# Patient Record
Sex: FEMALE | Race: WHITE | NOT HISPANIC OR LATINO | Employment: OTHER | URBAN - METROPOLITAN AREA
[De-identification: names, ages, dates, MRNs, and addresses within clinical notes are randomized per-mention and may not be internally consistent; named-entity substitution may affect disease eponyms.]

---

## 2020-12-02 ENCOUNTER — TELEPHONE ENCOUNTER (OUTPATIENT)
Dept: URBAN - METROPOLITAN AREA CLINIC 35 | Facility: CLINIC | Age: 65
End: 2020-12-02

## 2020-12-02 ENCOUNTER — OFFICE VISIT (OUTPATIENT)
Dept: URBAN - METROPOLITAN AREA CLINIC 35 | Facility: CLINIC | Age: 65
End: 2020-12-02

## 2020-12-02 ENCOUNTER — LAB OUTSIDE AN ENCOUNTER (OUTPATIENT)
Dept: URBAN - METROPOLITAN AREA SURGERY CENTER 8 | Facility: SURGERY CENTER | Age: 65
End: 2020-12-02

## 2020-12-02 VITALS
DIASTOLIC BLOOD PRESSURE: 74 MMHG | WEIGHT: 163 LBS | OXYGEN SATURATION: 95 % | SYSTOLIC BLOOD PRESSURE: 122 MMHG | HEART RATE: 72 BPM | BODY MASS INDEX: 24.71 KG/M2 | HEIGHT: 68 IN

## 2020-12-02 PROBLEM — 266433003 GASTRO-ESOPHAGEAL REFLUX DISEASE WITH ESOPHAGITIS: Status: ACTIVE | Noted: 2017-10-11

## 2020-12-02 RX ORDER — OMEPRAZOLE 40 MG/1
1 CAPSULE CAPSULE, DELAYED RELEASE ORAL ONCE A DAY
Qty: 30 CAPSULE | Refills: 5 | Status: ACTIVE | COMMUNITY
Start: 2017-08-23

## 2020-12-02 RX ORDER — ROSUVASTATIN CALCIUM 10 MG/1
1 TABLET TABLET, FILM COATED ORAL ONCE A DAY
Status: DISCONTINUED | COMMUNITY

## 2020-12-02 RX ORDER — RASAGILINE 1 MG/1
1 TABLET TABLET ORAL ONCE A DAY
Status: DISCONTINUED | COMMUNITY

## 2020-12-02 RX ORDER — CLONAZEPAM 0.5 MG/1
1 TABLET TABLET ORAL DAILY
Status: ACTIVE | COMMUNITY

## 2020-12-02 RX ORDER — CITALOPRAM HYDROBROMIDE 20 MG/1
1 TABLET TABLET ORAL ONCE A DAY
Status: ACTIVE | COMMUNITY

## 2020-12-02 RX ORDER — BUPROPION HYDROCHLORIDE 300 MG/1
1 TABLET IN THE MORNING TABLET, EXTENDED RELEASE ORAL ONCE A DAY
Status: ACTIVE | COMMUNITY

## 2020-12-02 RX ORDER — SODIUM SULFATE, POTASSIUM SULFATE, MAGNESIUM SULFATE 17.5; 3.13; 1.6 G/ML; G/ML; G/ML
177 ML SOLUTION, CONCENTRATE ORAL
Qty: 1 KIT | Refills: 0 | OUTPATIENT
Start: 2020-12-02

## 2020-12-02 RX ORDER — IBUPROFEN SODIUM 256 MG/1
1 TABLET WITH FOOD OR MILK AS NEEDED TABLET, COATED ORAL
Status: ACTIVE | COMMUNITY

## 2020-12-02 NOTE — HPI-MIGRATED HPI
;   ;     Heartburn : Patient presents today for a follow-up office visit from 10/30/2019. Patient is currently taking Omeprazole 40 mg, daily. Patient states heartburn is not controlled. Four days ago, she began taking yani root to help with heartburn and states that this helped relieve heartburn. Patient requests refills of Omeprazole 40 mg, kendall, in a 90 day supply, sent to confirmed pharmacy in EMR. ;   Abdominal Pain : Patient states that she began having abdominal pain in the epigastric region, since about June of 2020. She described this pain as a stabbing. Patient states that she experiences this pain, about every 2-3 days, with episodes lasting "a couple of hours". Patient states that dairy foods make the pain worse. She has changed to almond milk, but she still eats ice cream and cheese. Patient tried taking Advil for the abdominal pain and it did not help. She states that only time helps. Patient has not had any blood work, tests, or imaging done related to the abdominal pain. Patient states that she used to have a bowel movement every other day, however, "this fall", patient states that she is now going 4-5 days without having a bowel movement. Patient has not tried taking anything to help with improving her bowel habits. Patient admits to straining, "a lot". She admits that at times she has to manually remove the stool. Patient admits that her stool is very hard. She denies any blood or mucous in her stool. Patient denies melena. The only change in medication that she has had was to discontinue Rasagiline Mesylate. She was misdiagnosed with Parkinson's disease and stopped this medication about 60 days ago. As it turns out, the proper diagnosis is tardive dyskinesia. She is taking Clonazepam 0.5 mg, to help with symptoms of tardive dyskinesia. Patient had has unintentional weight loss of (by our records), 31 pounds since 10/30/2019. She believes this may be more, as she may have gained some (and sub sequentially lost), weight during this same time period.  She is having alot of constipation.;

## 2020-12-02 NOTE — EXAM-MIGRATED EXAMINATIONS
GENERAL APPEARANCE: - in no acute distress, well developed, well nourished;   HEAD: - normocephalic, atraumatic;   EYES: - pupils equal, round, reactive to light and accommodation, sclera anicteric;   EARS: - normal;   ORAL CAVITY: - mucosa moist, no lesions;   THROAT: - clear;   NECK/THYROID: - neck supple, full range of motion, no cervical lymphadenopathy, trachea midline, thyroid normal, , no supraclavicular lymphadenopathy;   LYMPH NODES: - no cervical adenopathy, ..., no supraclavicular adenopathy, cervical nodes firm;   SKIN: - no suspicious lesions, warm and dry;   HEART: - no murmurs, regular rate and rhythm, S1, S2 normal, no S3, S4, no rubs;   LUNGS: - clear to auscultation bilaterally;   CHEST: - normal;   ABDOMEN: - normal,  bowel sounds present,  soft, mild LLQ and midepigastric tenderness noted, nondistended;   RECTAL: - not examined;   EXTREMITIES: - no clubbing, cyanosis, or edema;   NEUROLOGIC: - nonfocal,  motor strength normal upper and lower extremities,  sensory exam intact.  Mild residual tardive dyskinesia noted.;   PSYCH: - alert, oriented, ..., Mood and affect are approprioate for current condition;

## 2020-12-03 ENCOUNTER — TELEPHONE ENCOUNTER (OUTPATIENT)
Dept: URBAN - METROPOLITAN AREA CLINIC 35 | Facility: CLINIC | Age: 65
End: 2020-12-03

## 2020-12-07 ENCOUNTER — TELEPHONE ENCOUNTER (OUTPATIENT)
Dept: URBAN - METROPOLITAN AREA CLINIC 35 | Facility: CLINIC | Age: 65
End: 2020-12-07

## 2020-12-07 RX ORDER — OMEPRAZOLE 40 MG/1
1 CAPSULE CAPSULE, DELAYED RELEASE ORAL ONCE A DAY
Qty: 90 CAPSULE | Refills: 2 | OUTPATIENT
Start: 2017-08-23

## 2020-12-08 ENCOUNTER — TELEPHONE ENCOUNTER (OUTPATIENT)
Dept: URBAN - METROPOLITAN AREA CLINIC 35 | Facility: CLINIC | Age: 65
End: 2020-12-08

## 2020-12-09 ENCOUNTER — OFFICE VISIT (OUTPATIENT)
Dept: URBAN - METROPOLITAN AREA SURGERY CENTER 8 | Facility: SURGERY CENTER | Age: 65
End: 2020-12-09

## 2020-12-23 ENCOUNTER — OFFICE VISIT (OUTPATIENT)
Dept: URBAN - METROPOLITAN AREA CLINIC 35 | Facility: CLINIC | Age: 65
End: 2020-12-23

## 2020-12-23 ENCOUNTER — TELEPHONE ENCOUNTER (OUTPATIENT)
Dept: URBAN - METROPOLITAN AREA CLINIC 35 | Facility: CLINIC | Age: 65
End: 2020-12-23

## 2020-12-23 VITALS — HEIGHT: 68 IN | BODY MASS INDEX: 24.25 KG/M2 | WEIGHT: 160 LBS

## 2020-12-23 PROBLEM — 84568007 ATROPHIC GASTRITIS: Status: ACTIVE | Noted: 2017-10-11

## 2020-12-23 PROBLEM — 63305008 STRICTURE OF ESOPHAGUS: Status: ACTIVE | Noted: 2017-10-11

## 2020-12-23 RX ORDER — OMEPRAZOLE 40 MG/1
1 CAPSULE CAPSULE, DELAYED RELEASE ORAL ONCE A DAY
Qty: 90 CAPSULE | Refills: 2 | Status: ACTIVE | COMMUNITY
Start: 2017-08-23

## 2020-12-23 RX ORDER — SODIUM SULFATE, POTASSIUM SULFATE, MAGNESIUM SULFATE 17.5; 3.13; 1.6 G/ML; G/ML; G/ML
177 ML SOLUTION, CONCENTRATE ORAL
Qty: 1 KIT | Refills: 0 | Status: ACTIVE | COMMUNITY
Start: 2020-12-02

## 2020-12-23 RX ORDER — CHOLECALCIFEROL (VITAMIN D3) 50 MCG
1 TABLET TABLET ORAL DAILY
Status: ACTIVE | COMMUNITY

## 2020-12-23 RX ORDER — BUPROPION HYDROCHLORIDE 300 MG/1
1 TABLET IN THE MORNING TABLET, EXTENDED RELEASE ORAL ONCE A DAY
Status: ACTIVE | COMMUNITY

## 2020-12-23 RX ORDER — CLONAZEPAM 0.5 MG/1
1 TABLET TABLET ORAL DAILY
Status: ACTIVE | COMMUNITY

## 2020-12-23 RX ORDER — CITALOPRAM HYDROBROMIDE 20 MG/1
1 TABLET TABLET ORAL ONCE A DAY
Status: ACTIVE | COMMUNITY

## 2020-12-23 RX ORDER — IBUPROFEN SODIUM 256 MG/1
1 TABLET WITH FOOD OR MILK AS NEEDED TABLET, COATED ORAL
Status: DISCONTINUED | COMMUNITY

## 2020-12-23 RX ORDER — CYANOCOBALAMIN (VITAMIN B-12) 500 MCG
2 LOZENGE ORAL BID
Status: ACTIVE | COMMUNITY

## 2020-12-23 NOTE — HPI-MIGRATED HPI
;     Follow up- EGD : Patient presents today for a follow-up after the EGD that was done on 12/09/2020. Since the procedure, patient denies globus sensation, dysphagia, changes in appetite, and changes in bowel habits. Patient denies any complications following the procedure. Patient states that she has not gotten the labs done yet. She pays out of pocket and will be able to get the labs done shortly. Patient continues taking Omeprazole 40 mg, daily. Patient has added Vitamin E, 400 IU, 2 tablets per day. She has also added in 1 tablespoonful per day. She states that the abdominal pain has improved in both intensity and frequency. Patient admits to straining when she has a bowel movement. She is having 1 bowel movement every 2-3 days, with "stools that are getting softer. ;

## 2021-01-11 ENCOUNTER — LAB OUTSIDE AN ENCOUNTER (OUTPATIENT)
Dept: URBAN - METROPOLITAN AREA SURGERY CENTER 8 | Facility: SURGERY CENTER | Age: 66
End: 2021-01-11

## 2021-01-11 ENCOUNTER — OFFICE VISIT (OUTPATIENT)
Dept: URBAN - METROPOLITAN AREA CLINIC 35 | Facility: CLINIC | Age: 66
End: 2021-01-11

## 2021-01-11 VITALS
HEART RATE: 71 BPM | HEIGHT: 68 IN | WEIGHT: 164 LBS | BODY MASS INDEX: 24.86 KG/M2 | DIASTOLIC BLOOD PRESSURE: 70 MMHG | SYSTOLIC BLOOD PRESSURE: 100 MMHG | OXYGEN SATURATION: 97 %

## 2021-01-11 RX ORDER — CLONAZEPAM 0.5 MG/1
1 TABLET TABLET ORAL DAILY
Status: ACTIVE | COMMUNITY

## 2021-01-11 RX ORDER — SODIUM SULFATE, POTASSIUM SULFATE, MAGNESIUM SULFATE 17.5; 3.13; 1.6 G/ML; G/ML; G/ML
177 ML SOLUTION, CONCENTRATE ORAL
Qty: 1 KIT | Refills: 0 | Status: ON HOLD | COMMUNITY
Start: 2020-12-02

## 2021-01-11 RX ORDER — CHOLECALCIFEROL (VITAMIN D3) 125 MCG
1 TABLET CAPSULE ORAL DAILY
Status: ON HOLD | COMMUNITY

## 2021-01-11 RX ORDER — CITALOPRAM HYDROBROMIDE 20 MG/1
1 TABLET TABLET ORAL ONCE A DAY
Status: ACTIVE | COMMUNITY

## 2021-01-11 RX ORDER — BUPROPION HYDROCHLORIDE 300 MG/1
1 TABLET IN THE MORNING TABLET, EXTENDED RELEASE ORAL ONCE A DAY
Status: ACTIVE | COMMUNITY

## 2021-01-11 RX ORDER — CYANOCOBALAMIN (VITAMIN B-12) 500 MCG
2 LOZENGE ORAL BID
Status: ON HOLD | COMMUNITY

## 2021-01-11 RX ORDER — OMEPRAZOLE 40 MG/1
1 CAPSULE CAPSULE, DELAYED RELEASE ORAL ONCE A DAY
Qty: 90 CAPSULE | Refills: 2 | Status: ACTIVE | COMMUNITY
Start: 2017-08-23

## 2021-01-11 NOTE — HPI-MIGRATED HPI
;     Follow up- EGD : Patient presents today for a follow-up after the EGD that was done on 12/09/2020. Since the procedure, patient denies globus sensation, dysphagia, changes in appetite, and changes in bowel habits. Patient denies any complications following the procedure. Patient states that she has not gotten the labs done yet. She pays out of pocket and will be able to get the labs done shortly. Patient continues taking Omeprazole 40 mg, daily. Patient denies adding Vitamin E, 400 IU, 2 tablets per day. Patient admits to adding Vitamin D, 5000 IU and Vitamin B-12 1000, 1 tablet per day. She states that the abdominal pain has improved in both intensity and frequency. Patient admits to straining when she has a bowel movement. She is having 1 bowel movement every 2-3 days, with stools that are getting softer.  Some right upper quadrant pain is noted.  NO fevers or chills are noted.;

## 2021-01-11 NOTE — EXAM-MIGRATED EXAMINATIONS
GENERAL APPEARANCE: - alert, in no acute distress, well developed, well nourished;   EYES: - sclera anicteric bilaterally;   HEART: - no murmurs, regular rate and rhythm, S1, S2 normal;   LUNGS: - clear to auscultation bilaterally, good air movement, no wheezes, rales, rhonchi;   ABDOMEN: - bowel sounds present,  no masses palpable,  no organomegaly ,  no rebound tenderness,  soft, right upper quadrant tenderness is noted. nondistended;

## 2021-02-18 ENCOUNTER — TELEPHONE ENCOUNTER (OUTPATIENT)
Dept: URBAN - METROPOLITAN AREA CLINIC 35 | Facility: CLINIC | Age: 66
End: 2021-02-18

## 2021-02-25 ENCOUNTER — OFFICE VISIT (OUTPATIENT)
Dept: URBAN - METROPOLITAN AREA SURGERY CENTER 8 | Facility: SURGERY CENTER | Age: 66
End: 2021-02-25

## 2021-02-25 RX ORDER — OMEPRAZOLE 40 MG/1
1 CAPSULE CAPSULE, DELAYED RELEASE ORAL ONCE A DAY
Qty: 90 CAPSULE | Refills: 2 | Status: ACTIVE | COMMUNITY
Start: 2017-08-23

## 2021-02-25 RX ORDER — SODIUM SULFATE, POTASSIUM SULFATE, MAGNESIUM SULFATE 17.5; 3.13; 1.6 G/ML; G/ML; G/ML
177 ML SOLUTION, CONCENTRATE ORAL
Qty: 1 KIT | Refills: 0 | Status: ON HOLD | COMMUNITY
Start: 2020-12-02

## 2021-02-25 RX ORDER — CITALOPRAM HYDROBROMIDE 20 MG/1
1 TABLET TABLET ORAL ONCE A DAY
Status: ACTIVE | COMMUNITY

## 2021-02-25 RX ORDER — CHOLECALCIFEROL (VITAMIN D3) 125 MCG
1 TABLET CAPSULE ORAL DAILY
Status: ON HOLD | COMMUNITY

## 2021-02-25 RX ORDER — BUPROPION HYDROCHLORIDE 300 MG/1
1 TABLET IN THE MORNING TABLET, EXTENDED RELEASE ORAL ONCE A DAY
Status: ACTIVE | COMMUNITY

## 2021-02-25 RX ORDER — CYANOCOBALAMIN (VITAMIN B-12) 500 MCG
2 LOZENGE ORAL BID
Status: ON HOLD | COMMUNITY

## 2021-02-25 RX ORDER — CLONAZEPAM 0.5 MG/1
1 TABLET TABLET ORAL DAILY
Status: ACTIVE | COMMUNITY

## 2021-03-10 ENCOUNTER — OFFICE VISIT (OUTPATIENT)
Dept: URBAN - METROPOLITAN AREA CLINIC 31 | Facility: CLINIC | Age: 66
End: 2021-03-10

## 2021-03-10 VITALS
SYSTOLIC BLOOD PRESSURE: 112 MMHG | WEIGHT: 166 LBS | HEART RATE: 67 BPM | OXYGEN SATURATION: 98 % | BODY MASS INDEX: 25.16 KG/M2 | DIASTOLIC BLOOD PRESSURE: 68 MMHG | HEIGHT: 68 IN

## 2021-03-10 RX ORDER — CHOLECALCIFEROL (VITAMIN D3) 125 MCG
1 TABLET CAPSULE ORAL DAILY
Status: ACTIVE | COMMUNITY

## 2021-03-10 RX ORDER — CYANOCOBALAMIN (VITAMIN B-12) 500 MCG
2 LOZENGE ORAL BID
Status: ACTIVE | COMMUNITY

## 2021-03-10 RX ORDER — CITALOPRAM HYDROBROMIDE 40 MG/1
1 TABLET TABLET, FILM COATED ORAL ONCE A DAY
Status: ACTIVE | COMMUNITY

## 2021-03-10 RX ORDER — OMEPRAZOLE 40 MG/1
1 CAPSULE CAPSULE, DELAYED RELEASE ORAL ONCE A DAY
Qty: 90 CAPSULE | Refills: 2 | Status: ACTIVE | COMMUNITY
Start: 2017-08-23

## 2021-03-10 RX ORDER — BUPROPION HYDROCHLORIDE 150 MG/1
0.4 ML TABLET, EXTENDED RELEASE ORAL ONCE A DAY
Status: ACTIVE | COMMUNITY

## 2021-03-10 RX ORDER — CLONAZEPAM 0.5 MG/1
1.5 TABLET TABLET ORAL DAILY
Status: ACTIVE | COMMUNITY

## 2021-03-10 RX ORDER — SODIUM SULFATE, POTASSIUM SULFATE, MAGNESIUM SULFATE 17.5; 3.13; 1.6 G/ML; G/ML; G/ML
177 ML SOLUTION, CONCENTRATE ORAL
Qty: 1 KIT | Refills: 0 | Status: DISCONTINUED | COMMUNITY
Start: 2020-12-02

## 2021-03-10 NOTE — EXAM-MIGRATED EXAMINATIONS
GENERAL APPEARANCE: - alert, in no acute distress, well developed, well nourished;   EYES: - sclera anicteric bilaterally;   ABDOMEN: - bowel sounds present,  no masses palpable,  no organomegaly ,  no rebound tenderness,  soft, mild transient RUQ (?muscular source), nondistended.  No Gray's sign or Courvoissier's sign;

## 2021-03-10 NOTE — HPI-MIGRATED HPI
;     Colonoscopy Follow-Up : Patient presents today for a follow-up after the colonoscopy that was done on 02/25/2021. Since the procedure patient states that she is having 1 (sometimes less frequent) bowel movements per day with normal to hard stools. She has not been using the MiraLax. Patient denies seeing any blood or mucous in her stool. Patient denies melena. Patient states that she had no complications following her colonoscopy. Patient has not done the RUQ ultrasound yet. She continues taking Omeprazole 40 mg daily and states that the choking sensation is somewhat improved. Patient admits to ongoing burning in her esophagus, about 2-3 times a week.    Patient completed labs on 02/21/2021 and the results were as follows :  CMP, all results were normal except for the following, Protein, total was LOW @ 5.9 g/dL. CBC with DIFF/PLT, all results were normal except for the following, RBC was HIGH @ 5.18.;

## 2021-08-18 ENCOUNTER — OFFICE VISIT (OUTPATIENT)
Dept: URBAN - METROPOLITAN AREA CLINIC 31 | Facility: CLINIC | Age: 66
End: 2021-08-18

## 2021-08-24 ENCOUNTER — TELEPHONE ENCOUNTER (OUTPATIENT)
Dept: URBAN - METROPOLITAN AREA CLINIC 35 | Facility: CLINIC | Age: 66
End: 2021-08-24

## 2021-08-24 RX ORDER — OMEPRAZOLE 40 MG/1
1 CAPSULE CAPSULE, DELAYED RELEASE ORAL ONCE A DAY
Qty: 90 CAPSULE | Refills: 3 | OUTPATIENT
Start: 2017-08-23

## 2021-09-01 ENCOUNTER — OFFICE VISIT (OUTPATIENT)
Dept: URBAN - METROPOLITAN AREA CLINIC 35 | Facility: CLINIC | Age: 66
End: 2021-09-01

## 2021-09-01 VITALS
HEIGHT: 68 IN | DIASTOLIC BLOOD PRESSURE: 72 MMHG | OXYGEN SATURATION: 98 % | WEIGHT: 195 LBS | SYSTOLIC BLOOD PRESSURE: 124 MMHG | BODY MASS INDEX: 29.55 KG/M2 | HEART RATE: 88 BPM

## 2021-09-01 RX ORDER — BUPROPION HYDROCHLORIDE 150 MG/1
0.4 ML TABLET, EXTENDED RELEASE ORAL ONCE A DAY
Status: ACTIVE | COMMUNITY

## 2021-09-01 RX ORDER — CLONAZEPAM 0.5 MG/1
1.5 TABLET TABLET ORAL DAILY
Status: ACTIVE | COMMUNITY

## 2021-09-01 RX ORDER — ROSUVASTATIN CALCIUM 10 MG/1
1 TABLET TABLET, FILM COATED ORAL ONCE A DAY
Qty: 30 | Status: ACTIVE | COMMUNITY

## 2021-09-01 RX ORDER — CITALOPRAM HYDROBROMIDE 40 MG/1
1 TABLET TABLET, FILM COATED ORAL ONCE A DAY
Status: ACTIVE | COMMUNITY

## 2021-09-01 RX ORDER — CYANOCOBALAMIN (VITAMIN B-12) 500 MCG
2 LOZENGE ORAL BID
Status: ACTIVE | COMMUNITY

## 2021-09-01 RX ORDER — OMEPRAZOLE 40 MG/1
1 CAPSULE CAPSULE, DELAYED RELEASE ORAL
Qty: 180 CAPSULE | Refills: 3
Start: 2017-08-23

## 2021-09-01 RX ORDER — OMEPRAZOLE 40 MG/1
1 CAPSULE CAPSULE, DELAYED RELEASE ORAL ONCE A DAY
Qty: 90 CAPSULE | Refills: 3 | Status: ACTIVE | COMMUNITY
Start: 2017-08-23

## 2021-09-01 RX ORDER — CHOLECALCIFEROL (VITAMIN D3) 125 MCG
1 TABLET CAPSULE ORAL DAILY
Status: ACTIVE | COMMUNITY

## 2021-09-01 NOTE — HPI-MIGRATED HPI
;     Heartburn : Patient presents today for a follow-up office visit from 03/10/2021. Patient is currently taking Omeprazole 40 mg, daily. Patient states heartburn worsened after she ran out of ginger root capsules. When she started taking them again, the heartburn seemed to improve. Patient states that the burning in her esophagus that she was having at her last office visit has remained the same. This is worse when she drinks wine. She is currently taking MiraLax, 1 capful, as needed only. She is currently having 1 bowel movement every 3 days with formed and hard stools. Patient denies any blood or mucous in her stool. She denies melena. Patient requests refills of Omeprazole 40 mg, daily, in a 90 day supply, sent to confirmed pharmacy in EMR. Of note, patient still has not gotten the ultrasound done.;

## 2021-12-01 ENCOUNTER — OFFICE VISIT (OUTPATIENT)
Dept: URBAN - METROPOLITAN AREA CLINIC 35 | Facility: CLINIC | Age: 66
End: 2021-12-01

## 2021-12-01 VITALS
WEIGHT: 195 LBS | HEIGHT: 68 IN | HEART RATE: 76 BPM | OXYGEN SATURATION: 96 % | BODY MASS INDEX: 29.55 KG/M2 | DIASTOLIC BLOOD PRESSURE: 76 MMHG | SYSTOLIC BLOOD PRESSURE: 122 MMHG

## 2021-12-01 RX ORDER — CYANOCOBALAMIN (VITAMIN B-12) 500 MCG
2 LOZENGE ORAL BID
Status: DISCONTINUED | COMMUNITY

## 2021-12-01 RX ORDER — BUPROPION HYDROCHLORIDE 150 MG/1
0.4 ML TABLET, EXTENDED RELEASE ORAL ONCE A DAY
Status: ACTIVE | COMMUNITY

## 2021-12-01 RX ORDER — FAMOTIDINE 20 MG/1
1 TABLET AT BEDTIME TABLET, FILM COATED ORAL ONCE A DAY
Qty: 90 TABLET | Refills: 3 | OUTPATIENT
Start: 2021-12-01

## 2021-12-01 RX ORDER — OMEPRAZOLE 40 MG/1
1 CAPSULE CAPSULE, DELAYED RELEASE ORAL
OUTPATIENT
Start: 2017-08-23

## 2021-12-01 RX ORDER — ROSUVASTATIN CALCIUM 10 MG/1
1 TABLET TABLET, FILM COATED ORAL ONCE A DAY
Qty: 30 | Status: ACTIVE | COMMUNITY

## 2021-12-01 RX ORDER — CITALOPRAM HYDROBROMIDE 40 MG/1
1 TABLET TABLET, FILM COATED ORAL ONCE A DAY
Status: ACTIVE | COMMUNITY

## 2021-12-01 RX ORDER — CHOLECALCIFEROL (VITAMIN D3) 125 MCG
1 TABLET CAPSULE ORAL DAILY
Status: ACTIVE | COMMUNITY

## 2021-12-01 RX ORDER — CLONAZEPAM 0.5 MG/1
1.5 TABLET TABLET ORAL DAILY
Status: ACTIVE | COMMUNITY

## 2021-12-01 RX ORDER — OMEPRAZOLE 40 MG/1
1 CAPSULE CAPSULE, DELAYED RELEASE ORAL
Qty: 180 CAPSULE | Refills: 3 | Status: ACTIVE | COMMUNITY
Start: 2017-08-23

## 2021-12-01 NOTE — EXAM-MIGRATED EXAMINATIONS
GENERAL APPEARANCE: - alert, in no acute distress, well developed, well nourished;   HEAD: - normocephalic, atraumatic;   EYES: - sclera anicteric bilaterally;   HEART: - no murmurs, regular rate and rhythm, S1, S2 normal;   LUNGS: - clear to auscultation bilaterally, good air movement, no wheezes, rales, rhonchi;   ABDOMEN: - bowel sounds present, no masses palpable, no organomegaly , no rebound tenderness, soft, nontender, nondistended;

## 2021-12-01 NOTE — HPI-MIGRATED HPI
;     Heartburn : Patient presents today for a follow-up office visit from 03/10/2021. Patient is currently taking Omeprazole 40 mg, BID. She denies heartburn in her chest area. Patient admits to regurgitation with drinking red wine. When she has regurgitation, she admits to having burning in her throat. Patient states that the burning in her esophagus that she was having at her last office visit has remained the same - it appears to be diet-related with spicy foods and occurs after lying down at night. She is currently taking MiraLax, 1 capful, as needed only. She is currently having 1 bowel movement per day with formed to hard stools. Patient denies any blood or mucous in her stool. She denies melena. RUQ abdominal pain is resolved.;

## 2022-03-16 ENCOUNTER — OFFICE VISIT (OUTPATIENT)
Dept: URBAN - METROPOLITAN AREA CLINIC 35 | Facility: CLINIC | Age: 67
End: 2022-03-16
Payer: MEDICARE

## 2022-03-16 VITALS
DIASTOLIC BLOOD PRESSURE: 84 MMHG | SYSTOLIC BLOOD PRESSURE: 126 MMHG | OXYGEN SATURATION: 97 % | HEART RATE: 92 BPM | WEIGHT: 195 LBS | BODY MASS INDEX: 29.55 KG/M2 | HEIGHT: 68 IN

## 2022-03-16 DIAGNOSIS — K29.50 CHRONIC GASTRITIS WITHOUT BLEEDING, UNSPECIFIED GASTRITIS TYPE: ICD-10-CM

## 2022-03-16 DIAGNOSIS — K59.09 CHRONIC CONSTIPATION: ICD-10-CM

## 2022-03-16 DIAGNOSIS — R74.01 ELEVATION OF LEVELS OF LIVER TRANSAMINASE LEVELS: ICD-10-CM

## 2022-03-16 DIAGNOSIS — K21.00 GASTRO-ESOPHAGEAL REFLUX DISEASE WITH ESOPHAGITIS, WITHOUT BLEEDING: ICD-10-CM

## 2022-03-16 PROCEDURE — 99214 OFFICE O/P EST MOD 30 MIN: CPT | Performed by: INTERNAL MEDICINE

## 2022-03-16 RX ORDER — CLONAZEPAM 0.5 MG/1
1.5 TABLET TABLET ORAL DAILY
Status: DISCONTINUED | COMMUNITY

## 2022-03-16 RX ORDER — CITALOPRAM HYDROBROMIDE 40 MG/1
1 TABLET TABLET, FILM COATED ORAL ONCE A DAY
Status: DISCONTINUED | COMMUNITY

## 2022-03-16 RX ORDER — CHOLECALCIFEROL (VITAMIN D3) 125 MCG
1 TABLET CAPSULE ORAL DAILY
Status: ACTIVE | COMMUNITY

## 2022-03-16 RX ORDER — TRAZODONE HYDROCHLORIDE 100 MG/1
1 TABLET, FILM COATED ORAL ONCE A DAY
Status: ACTIVE | COMMUNITY

## 2022-03-16 RX ORDER — ROSUVASTATIN CALCIUM 10 MG/1
1 TABLET TABLET, FILM COATED ORAL ONCE A DAY
Qty: 30 | Status: ACTIVE | COMMUNITY

## 2022-03-16 RX ORDER — OMEPRAZOLE 40 MG/1
1 CAPSULE CAPSULE, DELAYED RELEASE ORAL
Qty: 180 CAPSULES | Refills: 4

## 2022-03-16 RX ORDER — OMEPRAZOLE 40 MG/1
1 CAPSULE CAPSULE, DELAYED RELEASE ORAL
Status: ACTIVE | COMMUNITY
Start: 2017-08-23

## 2022-03-16 RX ORDER — BUPROPION HYDROCHLORIDE 150 MG/1
0.4 ML TABLET, EXTENDED RELEASE ORAL ONCE A DAY
Status: DISCONTINUED | COMMUNITY

## 2022-03-16 RX ORDER — FAMOTIDINE 20 MG/1
1 TABLET AT BEDTIME TABLET, FILM COATED ORAL ONCE A DAY
Qty: 90 TABLET | Refills: 3 | OUTPATIENT

## 2022-03-16 RX ORDER — FAMOTIDINE 20 MG/1
1 TABLET AT BEDTIME TABLET, FILM COATED ORAL ONCE A DAY
Qty: 90 TABLET | Refills: 3 | Status: ACTIVE | COMMUNITY
Start: 2021-12-01

## 2022-03-16 NOTE — HPI-HEARTBURN
Patient presents today for a follow-up office visit from 12/02/2021. She is currently taking Famotidine 20 mg, daily, and Omeprazole 40 mg, just once a day. She is limiting NSAIDs. Patient currently admits heartburn with taking the Omeprazole just once a day. She denies any dysphagia, nausea, or vomiting. She has not had any RUQ abdominal pain. Patient requests refills of Famotidine 20 mg, and Omeprazole 40 mg, BID, in a 90 day supply, sent to the confirmed pharmacy in EMR. Patient is getting over COVID-19 from February. Of note, patient uses MiraLax as needed.   Constipation is controlled, but has been "yo-yo ing" back in forth on it.

## 2022-10-17 ENCOUNTER — TELEPHONE ENCOUNTER (OUTPATIENT)
Dept: URBAN - METROPOLITAN AREA CLINIC 36 | Facility: CLINIC | Age: 67
End: 2022-10-17

## 2022-10-17 RX ORDER — OMEPRAZOLE 40 MG/1
1 CAPSULE CAPSULE, DELAYED RELEASE ORAL
Qty: 180 CAPSULE | Refills: 1

## 2023-03-02 ENCOUNTER — TELEPHONE ENCOUNTER (OUTPATIENT)
Dept: URBAN - METROPOLITAN AREA CLINIC 36 | Facility: CLINIC | Age: 68
End: 2023-03-02

## 2023-03-07 ENCOUNTER — OFFICE VISIT (OUTPATIENT)
Dept: URBAN - METROPOLITAN AREA CLINIC 31 | Facility: CLINIC | Age: 68
End: 2023-03-07
Payer: MEDICARE

## 2023-03-07 VITALS
WEIGHT: 201 LBS | OXYGEN SATURATION: 97 % | HEART RATE: 78 BPM | HEIGHT: 68 IN | SYSTOLIC BLOOD PRESSURE: 126 MMHG | DIASTOLIC BLOOD PRESSURE: 82 MMHG | BODY MASS INDEX: 30.46 KG/M2

## 2023-03-07 DIAGNOSIS — K29.50 CHRONIC GASTRITIS WITHOUT BLEEDING, UNSPECIFIED GASTRITIS TYPE: ICD-10-CM

## 2023-03-07 DIAGNOSIS — K58.2 IRRITABLE BOWEL SYNDROME WITH ALTERNATING BOWEL HABITS: ICD-10-CM

## 2023-03-07 DIAGNOSIS — R10.11 RIGHT UPPER QUADRANT ABDOMINAL PAIN: ICD-10-CM

## 2023-03-07 DIAGNOSIS — K21.00 GASTRO-ESOPHAGEAL REFLUX DISEASE WITH ESOPHAGITIS, WITHOUT BLEEDING: ICD-10-CM

## 2023-03-07 PROBLEM — 8493009: Status: ACTIVE | Noted: 2020-12-23

## 2023-03-07 PROBLEM — 428283002 HISTORY OF POLYP OF COLON (SITUATION): Status: ACTIVE | Noted: 2017-10-11

## 2023-03-07 PROCEDURE — 99213 OFFICE O/P EST LOW 20 MIN: CPT | Performed by: INTERNAL MEDICINE

## 2023-03-07 RX ORDER — ROSUVASTATIN CALCIUM 10 MG/1
1 TABLET TABLET, FILM COATED ORAL ONCE A DAY
Qty: 30 | Status: ACTIVE | COMMUNITY

## 2023-03-07 RX ORDER — OMEPRAZOLE 40 MG/1
1 CAPSULE CAPSULE, DELAYED RELEASE ORAL
Qty: 180 CAPSULES | Refills: 4

## 2023-03-07 RX ORDER — OMEPRAZOLE 40 MG/1
1 CAPSULE CAPSULE, DELAYED RELEASE ORAL
Qty: 180 CAPSULE | Refills: 1 | Status: ACTIVE | COMMUNITY

## 2023-03-07 RX ORDER — FAMOTIDINE 20 MG/1
1 TABLET AT BEDTIME TABLET, FILM COATED ORAL ONCE A DAY
Qty: 90 TABLET | Refills: 3 | Status: ACTIVE | COMMUNITY

## 2023-03-07 RX ORDER — FAMOTIDINE 20 MG/1
1 TABLET AT BEDTIME TABLET, FILM COATED ORAL ONCE A DAY
Qty: 90 TABLET | Refills: 3

## 2023-03-07 RX ORDER — ESZOPICLONE 2 MG/1
1 TABLET IMMEDIATELY BEFORE BEDTIME TABLET, COATED ORAL ONCE A DAY
Status: ACTIVE | COMMUNITY

## 2023-03-07 RX ORDER — METHSCOPOLAMINE BROMIDE 5 MG/1
1 TABLET 30 MINUTES BEFORE MEALS AND AT BEDTIME TABLET ORAL
Qty: 60 TABLET | Refills: 3 | OUTPATIENT
Start: 2023-03-07 | End: 2023-07-05

## 2023-03-07 RX ORDER — CHOLECALCIFEROL (VITAMIN D3) 125 MCG
1 TABLET CAPSULE ORAL DAILY
Status: ACTIVE | COMMUNITY

## 2023-03-07 NOTE — HPI-DIARRHEA
Patient presents today for diarrhea ongoing since last Thursday. Episodes occur (now) intermittently. She vomited at the first of this episode and this has since resolved. The loose stools have been improving, "some". Patient is currently having 1 bowel movement per day. Stools are loose and unformed. Patient denies blood in the stool. She denies mucous in her stool. Patient denies melena. She admits fecal urgency. Patient admits associated gas and bloating. The gas is pretty bad and this also includes belching. She has a "rumbly tummy". She admits associated abdominal pain and cramping. There is no improvement in the abdominal pain/cramping, after patient has a bowel movement. In fact, patient had a bowel movement just prior to coming to this office visit and her stomach is still cramping. Patient admits symptoms interrupting sleep, but she also states that she wakes up a lot to use the restroom. There has not been any unintentional weight loss. Patient denies recently drinking lake or well water and denies being out of the country in the last 3 months. Patient denies any recent changes in maintenance medication, and she denies using antibiotics in the last 3-6 months.

## 2023-04-21 ENCOUNTER — DASHBOARD ENCOUNTERS (OUTPATIENT)
Age: 68
End: 2023-04-21

## 2023-04-21 ENCOUNTER — CLAIMS CREATED FROM THE CLAIM WINDOW (OUTPATIENT)
Dept: URBAN - METROPOLITAN AREA CLINIC 31 | Facility: CLINIC | Age: 68
End: 2023-04-21
Payer: MEDICARE

## 2023-04-21 ENCOUNTER — WEB ENCOUNTER (OUTPATIENT)
Dept: URBAN - METROPOLITAN AREA CLINIC 31 | Facility: CLINIC | Age: 68
End: 2023-04-21

## 2023-04-21 VITALS
BODY MASS INDEX: 30.62 KG/M2 | HEART RATE: 96 BPM | SYSTOLIC BLOOD PRESSURE: 114 MMHG | HEIGHT: 68 IN | DIASTOLIC BLOOD PRESSURE: 68 MMHG | WEIGHT: 202 LBS | OXYGEN SATURATION: 78 %

## 2023-04-21 DIAGNOSIS — R10.11 RIGHT UPPER QUADRANT ABDOMINAL PAIN: ICD-10-CM

## 2023-04-21 DIAGNOSIS — R19.7 DIARRHEA OF PRESUMED INFECTIOUS ORIGIN: ICD-10-CM

## 2023-04-21 DIAGNOSIS — R19.4 CHANGE IN BOWEL HABIT: ICD-10-CM

## 2023-04-21 DIAGNOSIS — R19.7 ACUTE DIARRHEA: ICD-10-CM

## 2023-04-21 DIAGNOSIS — K58.9 IRRITABLE BOWEL SYNDROME, UNSPECIFIED TYPE: ICD-10-CM

## 2023-04-21 DIAGNOSIS — B96.81 HELICOBACTER PYLORI [H. PYLORI] AS THE CAUSE OF DISEASES CLASSIFIED ELSEWHERE: ICD-10-CM

## 2023-04-21 DIAGNOSIS — K29.50 CHRONIC GASTRITIS WITHOUT BLEEDING, UNSPECIFIED GASTRITIS TYPE: ICD-10-CM

## 2023-04-21 DIAGNOSIS — K44.9 HIATAL HERNIA: ICD-10-CM

## 2023-04-21 DIAGNOSIS — R10.13 EPIGASTRIC PAIN: ICD-10-CM

## 2023-04-21 DIAGNOSIS — K59.00 CONSTIPATION, UNSPECIFIED CONSTIPATION TYPE: ICD-10-CM

## 2023-04-21 DIAGNOSIS — Z86.010 PERSONAL HISTORY OF COLONIC POLYPS: ICD-10-CM

## 2023-04-21 DIAGNOSIS — K21.00 GASTRO-ESOPHAGEAL REFLUX DISEASE WITH ESOPHAGITIS, WITHOUT BLEEDING: ICD-10-CM

## 2023-04-21 DIAGNOSIS — R12 HEARTBURN: ICD-10-CM

## 2023-04-21 DIAGNOSIS — R74.01 ELEVATION OF LEVELS OF LIVER TRANSAMINASE LEVELS: ICD-10-CM

## 2023-04-21 PROBLEM — 14760008 CONSTIPATION: Status: ACTIVE | Noted: 2021-09-01

## 2023-04-21 PROCEDURE — 99213 OFFICE O/P EST LOW 20 MIN: CPT | Performed by: INTERNAL MEDICINE

## 2023-04-21 RX ORDER — METHSCOPOLAMINE BROMIDE 5 MG/1
1 TABLET 30 MINUTES BEFORE MEALS AND AT BEDTIME TABLET ORAL
Qty: 60 TABLET | Refills: 3 | Status: ACTIVE | COMMUNITY
Start: 2023-03-07 | End: 2023-07-05

## 2023-04-21 RX ORDER — OMEPRAZOLE 40 MG/1
1 CAPSULE CAPSULE, DELAYED RELEASE ORAL
Qty: 180 CAPSULES | Refills: 4 | Status: ACTIVE | COMMUNITY

## 2023-04-21 RX ORDER — CHOLECALCIFEROL (VITAMIN D3) 125 MCG
1 TABLET CAPSULE ORAL DAILY
Status: ACTIVE | COMMUNITY

## 2023-04-21 RX ORDER — ROSUVASTATIN CALCIUM 10 MG/1
1 TABLET TABLET, FILM COATED ORAL ONCE A DAY
Qty: 30 | Status: ACTIVE | COMMUNITY

## 2023-04-21 RX ORDER — METHSCOPOLAMINE BROMIDE 5 MG/1
1 TABLET 30 MINUTES BEFORE MEALS AND AT BEDTIME TABLET ORAL
Qty: 60 TABLET | Refills: 3 | OUTPATIENT

## 2023-04-21 RX ORDER — FAMOTIDINE 20 MG/1
1 TABLET AT BEDTIME TABLET, FILM COATED ORAL ONCE A DAY
Qty: 90 TABLET | Refills: 3 | Status: ACTIVE | COMMUNITY

## 2023-04-21 RX ORDER — FAMOTIDINE 20 MG/1
1 TABLET AT BEDTIME TABLET, FILM COATED ORAL ONCE A DAY
Qty: 90 TABLET | Refills: 3

## 2023-04-21 RX ORDER — ESZOPICLONE 2 MG/1
1 TABLET IMMEDIATELY BEFORE BEDTIME TABLET, COATED ORAL ONCE A DAY
Status: ACTIVE | COMMUNITY

## 2023-04-21 RX ORDER — OMEPRAZOLE 40 MG/1
1 CAPSULE CAPSULE, DELAYED RELEASE ORAL
Qty: 180 CAPSULES | Refills: 4

## 2023-04-21 NOTE — HPI-DIARRHEA
Patient presents today for a follow-up office visit from 03/07/2023. She admits improvement in the diarrhea.  Patient is currently having 1 bowel movement per day. Stools are currently normal and formed. Patient denies blood in the stool. She denies mucous in her stool. Patient denies melena. She admits improvement in fecal urgency. Patient denies associated gas and bloating. She continues to have excessive belching. She admits improvement in abdominal pain and cramping. Patient continues taking Omeprazole 40 mg, BID and Famotidine 20 mg at bedtime. She continues to experience breakthrough heartburn and also regurgitation. Of note, patient saw her ortho doctor and she is currently taking a daily NSAID but she cannot remember the name of it. She takes the NSAID with food.  Patient reports liver chemistries were normal at last check with PCP.